# Patient Record
Sex: FEMALE | URBAN - METROPOLITAN AREA
[De-identification: names, ages, dates, MRNs, and addresses within clinical notes are randomized per-mention and may not be internally consistent; named-entity substitution may affect disease eponyms.]

---

## 2022-11-11 ENCOUNTER — OFFICE (OUTPATIENT)
Dept: URBAN - METROPOLITAN AREA CLINIC 19 | Facility: CLINIC | Age: 67
End: 2022-11-11
Payer: COMMERCIAL

## 2022-11-11 VITALS
HEIGHT: 72 IN | HEART RATE: 59 BPM | SYSTOLIC BLOOD PRESSURE: 145 MMHG | DIASTOLIC BLOOD PRESSURE: 65 MMHG | OXYGEN SATURATION: 93 % | WEIGHT: 284 LBS

## 2022-11-11 DIAGNOSIS — R13.10 DYSPHAGIA, UNSPECIFIED: ICD-10-CM

## 2022-11-11 DIAGNOSIS — R15.9 FULL INCONTINENCE OF FECES: ICD-10-CM

## 2022-11-11 DIAGNOSIS — Z79.01 LONG TERM (CURRENT) USE OF ANTICOAGULANTS: ICD-10-CM

## 2022-11-11 DIAGNOSIS — R63.0 ANOREXIA: ICD-10-CM

## 2022-11-11 DIAGNOSIS — K52.9 NONINFECTIVE GASTROENTERITIS AND COLITIS, UNSPECIFIED: ICD-10-CM

## 2022-11-11 DIAGNOSIS — R19.7 DIARRHEA, UNSPECIFIED: ICD-10-CM

## 2022-11-11 LAB
C-REACTIVE PROTEIN, QUANT: 43 MG/L — HIGH (ref 0–10)
CBC, PLATELET, NO DIFFERENTIAL: HEMATOCRIT: 37.2 % (ref 34–46.6)
CBC, PLATELET, NO DIFFERENTIAL: HEMOGLOBIN: 10.7 G/DL — LOW (ref 11.1–15.9)
CBC, PLATELET, NO DIFFERENTIAL: MCH: 22.8 PG — LOW (ref 26.6–33)
CBC, PLATELET, NO DIFFERENTIAL: MCHC: 28.8 G/DL — LOW (ref 31.5–35.7)
CBC, PLATELET, NO DIFFERENTIAL: MCV: 79 FL (ref 79–97)
CBC, PLATELET, NO DIFFERENTIAL: PLATELETS: 387 X10E3/UL (ref 150–450)
CBC, PLATELET, NO DIFFERENTIAL: RBC: 4.69 X10E6/UL (ref 3.77–5.28)
CBC, PLATELET, NO DIFFERENTIAL: RDW: 15.4 % (ref 11.7–15.4)
CBC, PLATELET, NO DIFFERENTIAL: WBC: 11.4 X10E3/UL — HIGH (ref 3.4–10.8)
CELIAC DISEASE COMPREHENSIVE: DEAMIDATED GLIADIN ABS, IGA: 14 UNITS (ref 0–19)
CELIAC DISEASE COMPREHENSIVE: DEAMIDATED GLIADIN ABS, IGG: 2 UNITS (ref 0–19)
CELIAC DISEASE COMPREHENSIVE: ENDOMYSIAL ANTIBODY IGA: NEGATIVE
CELIAC DISEASE COMPREHENSIVE: IMMUNOGLOBULIN A, QN, SERUM: 1081 MG/DL — HIGH (ref 87–352)
CELIAC DISEASE COMPREHENSIVE: T-TRANSGLUTAMINASE (TTG) IGA: 3 U/ML (ref 0–3)
CELIAC DISEASE COMPREHENSIVE: T-TRANSGLUTAMINASE (TTG) IGG: 12 U/ML — HIGH (ref 0–5)
COMP. METABOLIC PANEL (14): A/G RATIO: 1 — LOW (ref 1.2–2.2)
COMP. METABOLIC PANEL (14): ALBUMIN: 4 G/DL (ref 3.8–4.8)
COMP. METABOLIC PANEL (14): ALKALINE PHOSPHATASE: 120 IU/L (ref 44–121)
COMP. METABOLIC PANEL (14): ALT (SGPT): 9 IU/L (ref 0–32)
COMP. METABOLIC PANEL (14): AST (SGOT): 16 IU/L (ref 0–40)
COMP. METABOLIC PANEL (14): BILIRUBIN, TOTAL: 0.2 MG/DL (ref 0–1.2)
COMP. METABOLIC PANEL (14): BUN/CREATININE RATIO: 24 (ref 12–28)
COMP. METABOLIC PANEL (14): BUN: 33 MG/DL — HIGH (ref 8–27)
COMP. METABOLIC PANEL (14): CALCIUM: 9.4 MG/DL (ref 8.7–10.3)
COMP. METABOLIC PANEL (14): CARBON DIOXIDE, TOTAL: 22 MMOL/L (ref 20–29)
COMP. METABOLIC PANEL (14): CHLORIDE: 96 MMOL/L (ref 96–106)
COMP. METABOLIC PANEL (14): CREATININE: 1.35 MG/DL — HIGH (ref 0.57–1)
COMP. METABOLIC PANEL (14): EGFR: 43 ML/MIN/1.73 — LOW (ref 59–?)
COMP. METABOLIC PANEL (14): GLOBULIN, TOTAL: 4 G/DL (ref 1.5–4.5)
COMP. METABOLIC PANEL (14): GLUCOSE: 306 MG/DL — HIGH (ref 70–99)
COMP. METABOLIC PANEL (14): POTASSIUM: 5.4 MMOL/L — HIGH (ref 3.5–5.2)
COMP. METABOLIC PANEL (14): PROTEIN, TOTAL: 8 G/DL (ref 6–8.5)
COMP. METABOLIC PANEL (14): SODIUM: 133 MMOL/L — LOW (ref 134–144)
SEDIMENTATION RATE-WESTERGREN: 53 MM/HR — HIGH (ref 0–40)

## 2022-11-11 PROCEDURE — 99204 OFFICE O/P NEW MOD 45 MIN: CPT

## 2022-11-11 RX ORDER — SODIUM PICOSULFATE, MAGNESIUM OXIDE, AND ANHYDROUS CITRIC ACID 10; 3.5; 12 MG/160ML; G/160ML; G/160ML
LIQUID ORAL
Qty: 320 | Refills: 0 | Status: COMPLETED
Start: 2022-11-11 | End: 2023-06-15

## 2022-11-11 NOTE — SERVICENOTES
The patient's assessment was reviewed with Dr. Vance and a collaborative plan of care was established.

## 2022-11-11 NOTE — SERVICEHPINOTES
67-year-old  white female from [place], [place] here for complaints of chronic, postprandial diarrhea, incontinence as well as symptoms of dysphagia.  She has apparently had a change in bowel habits ever since she had her gallbladder removed due to gallstones in 2015. Her symptoms seem to have progressively worsened as she now has experiencing incontinence of her stool on occasion.  She usually has 2-3 episodes of diarrhea daily and they are usually most acute and most urgent after she eats.  She denies abdominal pain or cramping associated with her symptoms.  She does have chronic reflux which is managed on omeprazole 20 mg from over-the-counter.  She has been having issues with dysphagia as well.  She did have the right part of her thyroid removed and was told by the endocrinologist that this would likely help with her dysphagia, but she denies any change in her symptoms.  She denies aspiration or unintentional weight loss.  She denies any overt GI bleeding.  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  She is on warfarin for this which is managed by her PCP, Juan Griffin MD.  She has not had issues holding the warfarin for other procedures previously.  She has had a colonoscopy before and it was last done in 2017 in [place], [place] (records requested ).  She apparently did have colon polyps removed with this last colonoscopy, but has had colonoscopies in the past with colon polyps removed.  Family history is negative for colon neoplasm.

## 2022-11-16 LAB
C-REACTIVE PROTEIN, QUANT: 43 MG/L — HIGH (ref 0–10)
CBC, PLATELET, NO DIFFERENTIAL: HEMATOCRIT: 37.2 % (ref 34–46.6)
CBC, PLATELET, NO DIFFERENTIAL: HEMOGLOBIN: 10.7 G/DL — LOW (ref 11.1–15.9)
CBC, PLATELET, NO DIFFERENTIAL: MCH: 22.8 PG — LOW (ref 26.6–33)
CBC, PLATELET, NO DIFFERENTIAL: MCHC: 28.8 G/DL — LOW (ref 31.5–35.7)
CBC, PLATELET, NO DIFFERENTIAL: MCV: 79 FL (ref 79–97)
CBC, PLATELET, NO DIFFERENTIAL: NRBC: (no result)
CBC, PLATELET, NO DIFFERENTIAL: PLATELETS: 387 X10E3/UL (ref 150–450)
CBC, PLATELET, NO DIFFERENTIAL: RBC: 4.69 X10E6/UL (ref 3.77–5.28)
CBC, PLATELET, NO DIFFERENTIAL: RDW: 15.4 % (ref 11.7–15.4)
CBC, PLATELET, NO DIFFERENTIAL: WBC: 11.4 X10E3/UL — HIGH (ref 3.4–10.8)
CELIAC DISEASE COMPREHENSIVE: DEAMIDATED GLIADIN ABS, IGA: 14 UNITS (ref 0–19)
CELIAC DISEASE COMPREHENSIVE: DEAMIDATED GLIADIN ABS, IGG: 2 UNITS (ref 0–19)
CELIAC DISEASE COMPREHENSIVE: ENDOMYSIAL ANTIBODY IGA: NEGATIVE
CELIAC DISEASE COMPREHENSIVE: IMMUNOGLOBULIN A, QN, SERUM: 1081 MG/DL — HIGH (ref 87–352)
CELIAC DISEASE COMPREHENSIVE: T-TRANSGLUTAMINASE (TTG) IGA: 3 U/ML (ref 0–3)
CELIAC DISEASE COMPREHENSIVE: T-TRANSGLUTAMINASE (TTG) IGG: 12 U/ML — HIGH (ref 0–5)
COMP. METABOLIC PANEL (14): A/G RATIO: 1 — LOW (ref 1.2–2.2)
COMP. METABOLIC PANEL (14): ALBUMIN: 4 G/DL (ref 3.8–4.8)
COMP. METABOLIC PANEL (14): ALKALINE PHOSPHATASE: 120 IU/L (ref 44–121)
COMP. METABOLIC PANEL (14): ALT (SGPT): 9 IU/L (ref 0–32)
COMP. METABOLIC PANEL (14): AST (SGOT): 16 IU/L (ref 0–40)
COMP. METABOLIC PANEL (14): BILIRUBIN, TOTAL: 0.2 MG/DL (ref 0–1.2)
COMP. METABOLIC PANEL (14): BUN/CREATININE RATIO: 24 (ref 12–28)
COMP. METABOLIC PANEL (14): BUN: 33 MG/DL — HIGH (ref 8–27)
COMP. METABOLIC PANEL (14): CALCIUM: 9.4 MG/DL (ref 8.7–10.3)
COMP. METABOLIC PANEL (14): CARBON DIOXIDE, TOTAL: 22 MMOL/L (ref 20–29)
COMP. METABOLIC PANEL (14): CHLORIDE: 96 MMOL/L (ref 96–106)
COMP. METABOLIC PANEL (14): CREATININE: 1.35 MG/DL — HIGH (ref 0.57–1)
COMP. METABOLIC PANEL (14): EGFR: 43 ML/MIN/1.73 — LOW (ref 59–?)
COMP. METABOLIC PANEL (14): GLOBULIN, TOTAL: 4 G/DL (ref 1.5–4.5)
COMP. METABOLIC PANEL (14): GLUCOSE: 306 MG/DL — HIGH (ref 70–99)
COMP. METABOLIC PANEL (14): POTASSIUM: 5.4 MMOL/L — HIGH (ref 3.5–5.2)
COMP. METABOLIC PANEL (14): PROTEIN, TOTAL: 8 G/DL (ref 6–8.5)
COMP. METABOLIC PANEL (14): SODIUM: 133 MMOL/L — LOW (ref 134–144)
SEDIMENTATION RATE-WESTERGREN: 53 MM/HR — HIGH (ref 0–40)

## 2022-12-02 LAB — PANCREATIC ELASTASE, FECAL: <50 UG ELAST./G — LOW

## 2023-06-15 ENCOUNTER — AMBULATORY SURGICAL CENTER (OUTPATIENT)
Dept: URBAN - METROPOLITAN AREA SURGERY 3 | Facility: SURGERY | Age: 68
End: 2023-06-15
Payer: COMMERCIAL

## 2023-06-15 ENCOUNTER — AMBULATORY SURGICAL CENTER (OUTPATIENT)
Dept: URBAN - METROPOLITAN AREA SURGERY 3 | Facility: SURGERY | Age: 68
End: 2023-06-15

## 2023-06-15 ENCOUNTER — OFFICE (OUTPATIENT)
Dept: URBAN - METROPOLITAN AREA PATHOLOGY 12 | Facility: PATHOLOGY | Age: 68
End: 2023-06-15
Payer: MEDICARE

## 2023-06-15 VITALS
HEART RATE: 64 BPM | DIASTOLIC BLOOD PRESSURE: 60 MMHG | HEART RATE: 54 BPM | DIASTOLIC BLOOD PRESSURE: 68 MMHG | RESPIRATION RATE: 18 BRPM | OXYGEN SATURATION: 94 % | HEART RATE: 52 BPM | RESPIRATION RATE: 32 BRPM | RESPIRATION RATE: 18 BRPM | RESPIRATION RATE: 14 BRPM | DIASTOLIC BLOOD PRESSURE: 77 MMHG | RESPIRATION RATE: 19 BRPM | TEMPERATURE: 98.2 F | SYSTOLIC BLOOD PRESSURE: 127 MMHG | HEIGHT: 72 IN | WEIGHT: 293 LBS | HEART RATE: 54 BPM | SYSTOLIC BLOOD PRESSURE: 131 MMHG | SYSTOLIC BLOOD PRESSURE: 119 MMHG | SYSTOLIC BLOOD PRESSURE: 119 MMHG | SYSTOLIC BLOOD PRESSURE: 141 MMHG | DIASTOLIC BLOOD PRESSURE: 77 MMHG | SYSTOLIC BLOOD PRESSURE: 133 MMHG | RESPIRATION RATE: 19 BRPM | RESPIRATION RATE: 18 BRPM | OXYGEN SATURATION: 99 % | WEIGHT: 293 LBS | DIASTOLIC BLOOD PRESSURE: 66 MMHG | DIASTOLIC BLOOD PRESSURE: 68 MMHG | SYSTOLIC BLOOD PRESSURE: 131 MMHG | TEMPERATURE: 98.4 F | HEIGHT: 72 IN | DIASTOLIC BLOOD PRESSURE: 66 MMHG | OXYGEN SATURATION: 99 % | TEMPERATURE: 98.2 F | HEART RATE: 64 BPM | OXYGEN SATURATION: 94 % | SYSTOLIC BLOOD PRESSURE: 127 MMHG | SYSTOLIC BLOOD PRESSURE: 119 MMHG | SYSTOLIC BLOOD PRESSURE: 141 MMHG | SYSTOLIC BLOOD PRESSURE: 133 MMHG | SYSTOLIC BLOOD PRESSURE: 131 MMHG | HEART RATE: 54 BPM | TEMPERATURE: 98.2 F | OXYGEN SATURATION: 98 % | OXYGEN SATURATION: 98 % | RESPIRATION RATE: 32 BRPM | HEART RATE: 64 BPM | RESPIRATION RATE: 32 BRPM | DIASTOLIC BLOOD PRESSURE: 56 MMHG | HEART RATE: 52 BPM | OXYGEN SATURATION: 99 % | DIASTOLIC BLOOD PRESSURE: 66 MMHG | SYSTOLIC BLOOD PRESSURE: 141 MMHG | DIASTOLIC BLOOD PRESSURE: 60 MMHG | DIASTOLIC BLOOD PRESSURE: 68 MMHG | OXYGEN SATURATION: 94 % | RESPIRATION RATE: 27 BRPM | WEIGHT: 293 LBS | HEART RATE: 52 BPM | SYSTOLIC BLOOD PRESSURE: 127 MMHG | RESPIRATION RATE: 19 BRPM | DIASTOLIC BLOOD PRESSURE: 60 MMHG | RESPIRATION RATE: 14 BRPM | RESPIRATION RATE: 27 BRPM | DIASTOLIC BLOOD PRESSURE: 77 MMHG | DIASTOLIC BLOOD PRESSURE: 56 MMHG | TEMPERATURE: 98.4 F | RESPIRATION RATE: 27 BRPM | TEMPERATURE: 98.4 F | HEIGHT: 72 IN | SYSTOLIC BLOOD PRESSURE: 133 MMHG | DIASTOLIC BLOOD PRESSURE: 56 MMHG | RESPIRATION RATE: 14 BRPM | OXYGEN SATURATION: 98 %

## 2023-06-15 DIAGNOSIS — K31.89 OTHER DISEASES OF STOMACH AND DUODENUM: ICD-10-CM

## 2023-06-15 DIAGNOSIS — D12.5 BENIGN NEOPLASM OF SIGMOID COLON: ICD-10-CM

## 2023-06-15 DIAGNOSIS — K29.70 GASTRITIS, UNSPECIFIED, WITHOUT BLEEDING: ICD-10-CM

## 2023-06-15 DIAGNOSIS — K44.9 DIAPHRAGMATIC HERNIA WITHOUT OBSTRUCTION OR GANGRENE: ICD-10-CM

## 2023-06-15 DIAGNOSIS — K21.00 GASTRO-ESOPHAGEAL REFLUX DISEASE WITH ESOPHAGITIS, WITHOUT B: ICD-10-CM

## 2023-06-15 DIAGNOSIS — R15.9 FULL INCONTINENCE OF FECES: ICD-10-CM

## 2023-06-15 DIAGNOSIS — R19.7 DIARRHEA, UNSPECIFIED: ICD-10-CM

## 2023-06-15 DIAGNOSIS — K59.9 FUNCTIONAL INTESTINAL DISORDER, UNSPECIFIED: ICD-10-CM

## 2023-06-15 DIAGNOSIS — K21.9 GASTRO-ESOPHAGEAL REFLUX DISEASE WITHOUT ESOPHAGITIS: ICD-10-CM

## 2023-06-15 DIAGNOSIS — R13.10 DYSPHAGIA, UNSPECIFIED: ICD-10-CM

## 2023-06-15 PROBLEM — K63.5 POLYP OF COLON: Status: ACTIVE | Noted: 2023-06-15

## 2023-06-15 PROCEDURE — 43239 EGD BIOPSY SINGLE/MULTIPLE: CPT | Mod: 59 | Performed by: INTERNAL MEDICINE

## 2023-06-15 PROCEDURE — 43248 EGD GUIDE WIRE INSERTION: CPT | Mod: 51 | Performed by: INTERNAL MEDICINE

## 2023-06-15 PROCEDURE — 88305 TISSUE EXAM BY PATHOLOGIST: CPT

## 2023-06-15 PROCEDURE — 45380 COLONOSCOPY AND BIOPSY: CPT | Performed by: INTERNAL MEDICINE
